# Patient Record
Sex: FEMALE | URBAN - METROPOLITAN AREA
[De-identification: names, ages, dates, MRNs, and addresses within clinical notes are randomized per-mention and may not be internally consistent; named-entity substitution may affect disease eponyms.]

---

## 2020-03-26 ENCOUNTER — APPOINTMENT (RX ONLY)
Dept: URBAN - METROPOLITAN AREA CLINIC 71 | Facility: CLINIC | Age: 60
Setting detail: DERMATOLOGY
End: 2020-03-26

## 2020-03-26 DIAGNOSIS — Z02.9 ENCOUNTER FOR ADMINISTRATIVE EXAMINATIONS, UNSPECIFIED: ICD-10-CM

## 2020-03-26 PROCEDURE — ? ADDITIONAL NOTES

## 2020-03-26 NOTE — PROCEDURE: ADDITIONAL NOTES
Additional Notes: Patient did not show for her telemedicine videoconference. Attempts were made via [employer] platform and an e-mail invitation for [email or web address] was sent to patient. Patient was contacted via phone and stated that she no longer wanted a telemedicine visit and wants to be seen in-office appointment. Patient was instructed to call the company phone line and request a visit at the [facility] location as that is the only open practice in [place] at this time. Additional Notes: Patient did not show for her telemedicine videoconference. Attempts were made via [employer] platform and an e-mail invitation for [email or web address] was sent to patient. Patient was contacted via phone and stated that she no longer wanted a telemedicine visit and wants to be seen in-office appointment. Patient was instructed to call the company phone line and request a visit at the [facility] location as that is the only open practice in [place] at this time.